# Patient Record
Sex: FEMALE | Race: WHITE | NOT HISPANIC OR LATINO | Employment: FULL TIME | URBAN - METROPOLITAN AREA
[De-identification: names, ages, dates, MRNs, and addresses within clinical notes are randomized per-mention and may not be internally consistent; named-entity substitution may affect disease eponyms.]

---

## 2017-05-01 ENCOUNTER — GENERIC CONVERSION - ENCOUNTER (OUTPATIENT)
Dept: OTHER | Facility: OTHER | Age: 47
End: 2017-05-01

## 2017-06-06 ENCOUNTER — ALLSCRIPTS OFFICE VISIT (OUTPATIENT)
Dept: OTHER | Facility: OTHER | Age: 47
End: 2017-06-06

## 2017-07-17 ENCOUNTER — APPOINTMENT (OUTPATIENT)
Dept: LAB | Facility: CLINIC | Age: 47
End: 2017-07-17
Payer: COMMERCIAL

## 2017-07-17 ENCOUNTER — TRANSCRIBE ORDERS (OUTPATIENT)
Dept: LAB | Facility: CLINIC | Age: 47
End: 2017-07-17

## 2017-07-17 DIAGNOSIS — Z00.00 ROUTINE GENERAL MEDICAL EXAMINATION AT A HEALTH CARE FACILITY: Primary | ICD-10-CM

## 2017-07-17 DIAGNOSIS — Z00.00 ROUTINE GENERAL MEDICAL EXAMINATION AT A HEALTH CARE FACILITY: ICD-10-CM

## 2017-07-17 LAB
ANION GAP SERPL CALCULATED.3IONS-SCNC: 7 MMOL/L (ref 4–13)
BUN SERPL-MCNC: 20 MG/DL (ref 5–25)
CALCIUM SERPL-MCNC: 9.8 MG/DL (ref 8.3–10.1)
CHLORIDE SERPL-SCNC: 105 MMOL/L (ref 100–108)
CO2 SERPL-SCNC: 31 MMOL/L (ref 21–32)
CREAT SERPL-MCNC: 1.09 MG/DL (ref 0.6–1.3)
ERYTHROCYTE [DISTWIDTH] IN BLOOD BY AUTOMATED COUNT: 11.9 % (ref 11.6–15.1)
GFR SERPL CREATININE-BSD FRML MDRD: 54 ML/MIN/1.73SQ M
GLUCOSE P FAST SERPL-MCNC: 92 MG/DL (ref 65–99)
HCT VFR BLD AUTO: 37.1 % (ref 34.8–46.1)
HGB BLD-MCNC: 12.4 G/DL (ref 11.5–15.4)
MCH RBC QN AUTO: 31.4 PG (ref 26.8–34.3)
MCHC RBC AUTO-ENTMCNC: 33.4 G/DL (ref 31.4–37.4)
MCV RBC AUTO: 94 FL (ref 82–98)
PLATELET # BLD AUTO: 217 THOUSANDS/UL (ref 149–390)
PMV BLD AUTO: 11.2 FL (ref 8.9–12.7)
POTASSIUM SERPL-SCNC: 3.7 MMOL/L (ref 3.5–5.3)
RBC # BLD AUTO: 3.95 MILLION/UL (ref 3.81–5.12)
SODIUM SERPL-SCNC: 143 MMOL/L (ref 136–145)
WBC # BLD AUTO: 6.43 THOUSAND/UL (ref 4.31–10.16)

## 2017-07-17 PROCEDURE — 85027 COMPLETE CBC AUTOMATED: CPT

## 2017-07-17 PROCEDURE — 36415 COLL VENOUS BLD VENIPUNCTURE: CPT

## 2017-07-17 PROCEDURE — 80048 BASIC METABOLIC PNL TOTAL CA: CPT

## 2017-07-25 ENCOUNTER — GENERIC CONVERSION - ENCOUNTER (OUTPATIENT)
Dept: OTHER | Facility: OTHER | Age: 47
End: 2017-07-25

## 2017-08-01 ENCOUNTER — ALLSCRIPTS OFFICE VISIT (OUTPATIENT)
Dept: OTHER | Facility: OTHER | Age: 47
End: 2017-08-01

## 2017-09-08 ENCOUNTER — GENERIC CONVERSION - ENCOUNTER (OUTPATIENT)
Dept: OTHER | Facility: OTHER | Age: 47
End: 2017-09-08

## 2017-11-03 ENCOUNTER — GENERIC CONVERSION - ENCOUNTER (OUTPATIENT)
Dept: OTHER | Facility: OTHER | Age: 47
End: 2017-11-03

## 2018-01-12 VITALS — WEIGHT: 131.13 LBS | HEIGHT: 63 IN | BODY MASS INDEX: 23.23 KG/M2

## 2018-01-13 NOTE — PROGRESS NOTES
Active Problems    1  Basal cell carcinoma of face (173 31) (C44 310)    Current Meds   1  Labetalol HCl - 200 MG Oral Tablet; Therapy: (Recorded:06Jun2017) to Recorded   2  Lunesta 2 MG Oral Tablet (Eszopiclone); Therapy: (Recorded:06Jun2017) to Recorded   3  Zomig 5 MG Oral Tablet (ZOLMitriptan); Therapy: (Recorded:06Jun2017) to Recorded    Allergies    1  No Known Drug Allergies    Vitals  Signs    BP Comments: unobtainable  Height: 5 ft 2 5 in  Weight: 130 lb   BMI Calculated: 23 4  BSA Calculated: 1 6    Discussion/Summary  Patient presents for suture removal post right cheek moh's recon  Incision well healed  Post op scar care instructions reviewed and given  Patient verbalized understanding and has no further questions  Will follow up with Dr Castillo Russ or PA in 3 months          Signatures   Electronically signed by : Diego Norris RN; Aug  1 2017 10:54AM EST                       (Author)    Electronically signed by : CHONG Alas ; Aug  2 2017  3:38PM EST

## 2018-01-14 VITALS — HEIGHT: 63 IN | BODY MASS INDEX: 23.04 KG/M2 | WEIGHT: 130 LBS

## 2018-01-17 NOTE — MISCELLANEOUS
Message   Date: 26 Jul 2017 2:39 PM EST, Recorded By: Greer Ivy   Calling For: Greer Ivy   Caller: Felicita Chaparro, Care Coordinator   Reason: Care Coordination   I called the patient to check on her from her surgery yesterday  She stated she is doing well and feels like everything is right on track  She did not have any questions for me at this time  I advised her to call us at any time if she needs anything  She has her post operative appointment set up for August 1st with our nurse [address] [place] [address]  Active Problems    1  Basal cell carcinoma of face (173 31) (C44 310)    Current Meds   1  Labetalol HCl - 200 MG Oral Tablet; Therapy: (Recorded:06Jun2017) to Recorded   2  Lunesta 2 MG Oral Tablet (Eszopiclone); Therapy: (Recorded:06Jun2017) to Recorded   3  Zomig 5 MG Oral Tablet (ZOLMitriptan); Therapy: (Recorded:06Jun2017) to Recorded    Allergies    1   No Known Drug Allergies    Signatures   Electronically signed by : Felicita Chaparro, ; Jul 26 2017  2:41PM EST                       (Author)

## 2018-01-22 VITALS — HEIGHT: 63 IN | WEIGHT: 124.38 LBS | BODY MASS INDEX: 22.04 KG/M2

## 2018-01-22 VITALS — BODY MASS INDEX: 21.79 KG/M2 | HEIGHT: 63 IN | WEIGHT: 123 LBS

## 2018-01-24 ENCOUNTER — OFFICE VISIT (OUTPATIENT)
Dept: PLASTIC SURGERY | Facility: HOSPITAL | Age: 48
End: 2018-01-24

## 2018-01-24 ENCOUNTER — OFFICE VISIT (OUTPATIENT)
Dept: PLASTIC SURGERY | Facility: HOSPITAL | Age: 48
End: 2018-01-24
Payer: COMMERCIAL

## 2018-01-24 VITALS — WEIGHT: 124.6 LBS | HEIGHT: 63 IN | BODY MASS INDEX: 22.08 KG/M2

## 2018-01-24 VITALS — BODY MASS INDEX: 22.08 KG/M2 | HEIGHT: 63 IN | WEIGHT: 124.6 LBS

## 2018-01-24 DIAGNOSIS — Z41.1 ENCOUNTER FOR COSMETIC SURGERY: Primary | ICD-10-CM

## 2018-01-24 DIAGNOSIS — C44.90 SKIN CANCER: Primary | ICD-10-CM

## 2018-01-24 PROCEDURE — COSCON: Performed by: SURGERY

## 2018-01-24 PROCEDURE — 99213 OFFICE O/P EST LOW 20 MIN: CPT | Performed by: SURGERY

## 2018-01-24 NOTE — PROGRESS NOTES
Assessment/Plan: Please see HPI  She Would like to be a bit larger, perhaps not even a full cup size, and she will review the website information planned info  com and download some photographs that she finds appealing for our review at the next visit  We discussed breast implants in general, specifically saline versus silicone, smooth versus Textured, round versus anatomic, etc   We also discussed the phenomenon and implant associated lymphoma  Several implants in a bra, and feels that these particular sizes (300 cc range pregnancy are bigger than what she would prefer  We'll repeat this exercise with some smaller sizer was at her next visit  Appropriate measurements and photographs were obtained, she will review the implant info  com site and return for a second visit for us to review some photographs in determine an implant size  She has well-healed inframammary incisions from her initial augmentation, there are bit high on the breast, we discussed utilizing the same scar as opposed to placing a second scar on the breast and she is in agreement with this approach  She is aware that the incision being longer if a silicone implant is used  She will work with our surgical coordinator in regard to the fees, and we will see her back for a second visit in the near future if she is interested in proceeding  Diagnoses and all orders for this visit:    Encounter for cosmetic surgery          Subjective: Interested in larger breast volume     Patient ID: Dillon Moura is a 52 y o  female  Jacinto Hines is a 66-year-old female who had undergone bilateral breast augmentation in December 1995 at [facility]   She has had no breast surgery since that time  She is interested in undergoing implant exchange to a larger breast volume  She currently has 210 cc saline implants which are inflated to 220 cc  She has had 2 children, breast-fed both of her proximally 6 months    She currently wears a 30 4C cup bra and would prefer to be "a little bit bigger"  Review of Systems   Constitutional: Negative for chills and fever  HENT: Negative for congestion  Eyes: Negative for discharge  Respiratory: Negative for chest tightness  Cardiovascular: Negative for chest pain  Gastrointestinal: Negative for abdominal pain, diarrhea and nausea  Genitourinary: Negative for difficulty urinating  Musculoskeletal: Negative for arthralgias  Skin: Negative for rash and wound  Allergic/Immunologic: Negative for immunocompromised state  Neurological: Positive for headaches  Negative for facial asymmetry  Hematological: Does not bruise/bleed easily  Psychiatric/Behavioral: The patient is not nervous/anxious  Objective:     Physical Exam   Constitutional: She is oriented to person, place, and time  She appears well-developed and well-nourished  HENT:   Head: Normocephalic and atraumatic  Eyes: Pupils are equal, round, and reactive to light  Neck: Normal range of motion  Pulmonary/Chest: No respiratory distress  She has no wheezes  Neurological: She is alert and oriented to person, place, and time  Skin: No rash noted  No erythema  Psychiatric: She has a normal mood and affect  Her behavior is normal      Breast examination reveals the presence of bilateral breast implants, there is evidence of capsular contracture, there is grade 2 ptosis  Sternal notch to nipple distance is 22 cm bilaterally, intramammary distance is 17 cm  Inframammary fold to nipple distance is on the left are 7/9 5 cm, on the right 6 5/7 5 cm  The current base diameter is 11 cm, the goal would be approximately 12 cm

## 2018-01-24 NOTE — PROGRESS NOTES
Assessment/Plan: Continue avoidance of significant sun exposure, massage right cheek scar and will follow up in 3-4 months     There are no diagnoses linked to this encounter  Subjective: Follow-up visit status post Reconstruction Mohs defect of The right cheek     Patient ID: Sobeida Garcia is a 52 y o  female  HPI Maria Del Carmen Lazo presents today in follow-up, status post reconstruction of a Mohs defect of the right cheek In August     Review of Systems   Constitutional: Negative for chills and fever  HENT: Negative for congestion and facial swelling  Eyes: Negative for discharge  Respiratory: Negative for wheezing and stridor  Cardiovascular: Negative for chest pain and leg swelling  Gastrointestinal: Negative for constipation, diarrhea and nausea  Endocrine: Negative for polyuria  Genitourinary: Negative for difficulty urinating  Musculoskeletal: Negative for back pain and gait problem  Skin: Negative for color change, pallor, rash and wound  Neurological: Positive for headaches  Hematological: Does not bruise/bleed easily  Psychiatric/Behavioral: The patient is not nervous/anxious  Objective:     Physical Exam   Constitutional: She appears well-developed and well-nourished  HENT:   Head: Normocephalic  Eyes: Pupils are equal, round, and reactive to light  Neck: Normal range of motion  Neck supple  Pulmonary/Chest: No respiratory distress  She has no wheezes  Skin: No rash noted  No erythema  No pallor  Well-healed scar of right cheek, slight protuberance at superior and inferior aspect, no evidence of recurrence, no evidence of scar hypertrophy   Psychiatric: She has a normal mood and affect   Her behavior is normal

## 2021-01-14 DIAGNOSIS — Z23 ENCOUNTER FOR IMMUNIZATION: ICD-10-CM
